# Patient Record
Sex: FEMALE | Race: OTHER | Employment: OTHER | URBAN - METROPOLITAN AREA
[De-identification: names, ages, dates, MRNs, and addresses within clinical notes are randomized per-mention and may not be internally consistent; named-entity substitution may affect disease eponyms.]

---

## 2017-10-09 ENCOUNTER — NEW PATIENT COMPREHENSIVE (OUTPATIENT)
Dept: URBAN - METROPOLITAN AREA CLINIC 43 | Facility: CLINIC | Age: 72
End: 2017-10-09

## 2017-10-09 VITALS — HEIGHT: 55 IN | DIASTOLIC BLOOD PRESSURE: 74 MMHG | SYSTOLIC BLOOD PRESSURE: 149 MMHG | HEART RATE: 65 BPM

## 2017-10-09 DIAGNOSIS — H25.812: ICD-10-CM

## 2017-10-09 DIAGNOSIS — H43.812: ICD-10-CM

## 2017-10-09 DIAGNOSIS — H25.811: ICD-10-CM

## 2017-10-09 PROCEDURE — 99204 OFFICE O/P NEW MOD 45 MIN: CPT

## 2017-10-09 PROCEDURE — 92136TC INTERFEROMETRY - TECHNICAL COMPONENT

## 2017-10-09 PROCEDURE — G8753 SYS BP > OR = 140: HCPCS

## 2017-10-09 PROCEDURE — 92025-2 CORNEAL TOPOGRAPHY, PT

## 2017-10-09 PROCEDURE — G8482 FLU IMMUNIZE ORDER/ADMIN: HCPCS

## 2017-10-09 PROCEDURE — 4040F PNEUMOC VAC/ADMIN/RCVD: CPT

## 2017-10-09 PROCEDURE — 1036F TOBACCO NON-USER: CPT

## 2017-10-09 PROCEDURE — G8754 DIAS BP LESS 90: HCPCS

## 2017-10-09 PROCEDURE — G8427 DOCREV CUR MEDS BY ELIG CLIN: HCPCS

## 2017-10-09 PROCEDURE — 92134 CPTRZ OPH DX IMG PST SGM RTA: CPT

## 2017-10-09 ASSESSMENT — TONOMETRY
OS_IOP_MMHG: 16
OD_IOP_MMHG: 17

## 2017-10-09 ASSESSMENT — VISUAL ACUITY
OS_SC: J8
OD_SC: 20/100
OD_PH: 20/40-1
OS_SC: 20/80
OS_PH: 20/50-1
OS_GLARE: 20/100
OD_GLARE: 20/200
OD_SC: J3

## 2017-10-26 ENCOUNTER — SURGERY/PROCEDURE (OUTPATIENT)
Dept: URBAN - METROPOLITAN AREA CLINIC 43 | Facility: CLINIC | Age: 72
End: 2017-10-26

## 2017-10-26 ENCOUNTER — POST-OP (OUTPATIENT)
Dept: URBAN - METROPOLITAN AREA SURGERY 14 | Facility: SURGERY | Age: 72
End: 2017-10-26

## 2017-10-26 DIAGNOSIS — H25.811: ICD-10-CM

## 2017-10-26 DIAGNOSIS — Z96.1: ICD-10-CM

## 2017-10-26 PROCEDURE — 66984CV REMOVE CATARACT, INSERT LENS, CUSTOM VISION

## 2017-10-26 ASSESSMENT — VISUAL ACUITY
OD_SC: 20/60
OS_SC: 20/50-1

## 2017-10-26 ASSESSMENT — TONOMETRY
OD_IOP_MMHG: 16
OS_IOP_MMHG: 16

## 2017-10-27 ENCOUNTER — CATARACT POST-OP 1-DAY (OUTPATIENT)
Dept: URBAN - METROPOLITAN AREA CLINIC 43 | Facility: CLINIC | Age: 72
End: 2017-10-27

## 2017-10-27 DIAGNOSIS — Z96.1: ICD-10-CM

## 2017-10-27 PROCEDURE — 99024 POSTOP FOLLOW-UP VISIT: CPT

## 2017-10-27 ASSESSMENT — TONOMETRY: OD_IOP_MMHG: 15

## 2017-10-27 ASSESSMENT — VISUAL ACUITY: OD_SC: 20/50+2

## 2017-10-30 ENCOUNTER — POST OP/EVAL OF SECOND EYE (OUTPATIENT)
Dept: URBAN - METROPOLITAN AREA CLINIC 43 | Facility: CLINIC | Age: 72
End: 2017-10-30

## 2017-10-30 DIAGNOSIS — H25.812: ICD-10-CM

## 2017-10-30 PROCEDURE — G8756 NO BP MEASURE DOC: HCPCS

## 2017-10-30 PROCEDURE — 92012 INTRM OPH EXAM EST PATIENT: CPT

## 2017-10-30 PROCEDURE — 1036F TOBACCO NON-USER: CPT

## 2017-10-30 PROCEDURE — G8427 DOCREV CUR MEDS BY ELIG CLIN: HCPCS

## 2017-10-30 ASSESSMENT — VISUAL ACUITY
OS_PH: 20/40-1
OS_SC: 20/70+1
OD_SC: 20/30-2

## 2017-10-30 ASSESSMENT — TONOMETRY
OS_IOP_MMHG: 12
OD_IOP_MMHG: 14

## 2017-11-02 ENCOUNTER — SURGERY/PROCEDURE (OUTPATIENT)
Dept: URBAN - METROPOLITAN AREA CLINIC 43 | Facility: CLINIC | Age: 72
End: 2017-11-02

## 2017-11-02 ENCOUNTER — POST-OP (OUTPATIENT)
Dept: URBAN - METROPOLITAN AREA CLINIC 39 | Facility: CLINIC | Age: 72
End: 2017-11-02

## 2017-11-02 DIAGNOSIS — Z96.1: ICD-10-CM

## 2017-11-02 DIAGNOSIS — H25.812: ICD-10-CM

## 2017-11-02 PROCEDURE — 66984CV REMOVE CATARACT, INSERT LENS, CUSTOM VISION

## 2017-11-02 PROCEDURE — 66999LNSR LENSAR LASER FOR CAT SX

## 2017-11-02 ASSESSMENT — VISUAL ACUITY
OD_SC: 20/30-2
OS_SC: 20/40-1
OD_SC: J10
OS_SC: J12

## 2017-11-02 ASSESSMENT — TONOMETRY
OS_IOP_MMHG: 12
OD_IOP_MMHG: 14

## 2017-11-03 ENCOUNTER — CATARACT POST-OP 1-DAY (OUTPATIENT)
Dept: URBAN - METROPOLITAN AREA CLINIC 43 | Facility: CLINIC | Age: 72
End: 2017-11-03

## 2017-11-03 DIAGNOSIS — Z96.1: ICD-10-CM

## 2017-11-03 PROCEDURE — 99024 POSTOP FOLLOW-UP VISIT: CPT

## 2017-11-03 ASSESSMENT — VISUAL ACUITY
OD_SC: 20/30-1
OS_SC: J12+
OD_SC: J4
OS_SC: 20/50+1

## 2017-11-03 ASSESSMENT — TONOMETRY: OS_IOP_MMHG: 16

## 2017-11-20 ENCOUNTER — POST-OP CATARACT (OUTPATIENT)
Dept: URBAN - METROPOLITAN AREA CLINIC 43 | Facility: CLINIC | Age: 72
End: 2017-11-20

## 2017-11-20 DIAGNOSIS — Z96.1: ICD-10-CM

## 2017-11-20 PROCEDURE — 99024 POSTOP FOLLOW-UP VISIT: CPT

## 2017-11-20 ASSESSMENT — VISUAL ACUITY
OS_PH: 20/40-2
OS_SC: 20/200
OS_SC: J1
OD_SC: 20/25-3
OD_SC: J3

## 2017-11-20 ASSESSMENT — TONOMETRY
OD_IOP_MMHG: 14
OS_IOP_MMHG: 14

## 2018-05-03 ENCOUNTER — ESTABLISHED COMPREHENSIVE EXAM (OUTPATIENT)
Dept: URBAN - METROPOLITAN AREA CLINIC 43 | Facility: CLINIC | Age: 73
End: 2018-05-03

## 2018-05-03 DIAGNOSIS — H04.123: ICD-10-CM

## 2018-05-03 DIAGNOSIS — Z96.1: ICD-10-CM

## 2018-05-03 DIAGNOSIS — H43.812: ICD-10-CM

## 2018-05-03 PROCEDURE — 1036F TOBACCO NON-USER: CPT

## 2018-05-03 PROCEDURE — G8427 DOCREV CUR MEDS BY ELIG CLIN: HCPCS

## 2018-05-03 PROCEDURE — 92014 COMPRE OPH EXAM EST PT 1/>: CPT

## 2018-05-03 PROCEDURE — 92015 DETERMINE REFRACTIVE STATE: CPT

## 2018-05-03 ASSESSMENT — VISUAL ACUITY
OD_BAT: 20/50
OD_SC: J10
OS_SC: J1
OS_SC: 20/100
OD_SC: 20/30-3
OS_BAT: 20/50
OS_PH: 20/30-1

## 2018-05-03 ASSESSMENT — TONOMETRY
OD_IOP_MMHG: 11
OS_IOP_MMHG: 11

## 2019-05-06 ENCOUNTER — ESTABLISHED COMPREHENSIVE EXAM (OUTPATIENT)
Dept: URBAN - METROPOLITAN AREA CLINIC 43 | Facility: CLINIC | Age: 74
End: 2019-05-06

## 2019-05-06 DIAGNOSIS — H43.812: ICD-10-CM

## 2019-05-06 DIAGNOSIS — Z96.1: ICD-10-CM

## 2019-05-06 DIAGNOSIS — H04.123: ICD-10-CM

## 2019-05-06 PROCEDURE — 92014 COMPRE OPH EXAM EST PT 1/>: CPT

## 2019-05-06 PROCEDURE — 92015 DETERMINE REFRACTIVE STATE: CPT

## 2019-05-06 ASSESSMENT — VISUAL ACUITY
OU_SC: 20/25-3
OS_SC: J1
OS_PH: 20/40
OU_SC: J1-
OD_SC: 20/30
OS_SC: 20/200
OD_SC: J8

## 2019-05-06 ASSESSMENT — TONOMETRY
OD_IOP_MMHG: 12
OS_IOP_MMHG: 12

## 2020-02-26 NOTE — PATIENT DISCUSSION
COUNSELING:  I have talked with the patient about my impressions, explained our treatment plan, and have answered all questions. Continue present medications. Return in 6 months for LARY.

## 2020-09-22 NOTE — PATIENT DISCUSSION
GLAUCOMA:  I have talked with the patient about my impressions, explained our treatment plan, and have answered all questions and patient understands. Continue present eye drops.

## 2021-05-14 NOTE — PATIENT DISCUSSION
GLAUCOMA:  I have talked with the patient about my impressions, explained our treatment plan, and have answered all questions and patient understands. Continue present eye drops. Patient to follow up 8 months.  VF OU

## 2021-05-14 NOTE — PATIENT DISCUSSION
DIABETES without RETINOPATHY:  I have talked with the patient about the potential for future development of diabetic retinopathy and the potential for significant visual complications.   For this reason regular follow-up with an eye doctor is essential. No retinopathy on todays dilated exam.

## 2022-01-07 NOTE — PATIENT DISCUSSION
GLAUCOMA:  I have talked with the patient about my impressions, explained our treatment plan, and have answered all questions and patient understands. Continue present eye drops. Patient to follow up 1 year with dilation and OCT.

## 2022-05-04 ENCOUNTER — COMPREHENSIVE EXAM (OUTPATIENT)
Dept: URBAN - METROPOLITAN AREA CLINIC 30 | Facility: CLINIC | Age: 77
End: 2022-05-04

## 2022-05-04 DIAGNOSIS — H40.013: ICD-10-CM

## 2022-05-04 DIAGNOSIS — H04.123: ICD-10-CM

## 2022-05-04 PROCEDURE — 92133 CPTRZD OPH DX IMG PST SGM ON: CPT

## 2022-05-04 PROCEDURE — 76514 ECHO EXAM OF EYE THICKNESS: CPT

## 2022-05-04 PROCEDURE — 92014 COMPRE OPH EXAM EST PT 1/>: CPT

## 2022-05-04 ASSESSMENT — TONOMETRY
OD_IOP_MMHG: 9
OD_IOP_MMHG: 11
OS_IOP_MMHG: 12
OS_IOP_MMHG: 12

## 2022-05-04 ASSESSMENT — KERATOMETRY
OS_K2POWER_DIOPTERS: 38.75
OS_K1POWER_DIOPTERS: 39.75
OS_AXISANGLE2_DEGREES: 96
OD_K2POWER_DIOPTERS: 38.75
OD_K1POWER_DIOPTERS: 42.00
OD_AXISANGLE2_DEGREES: 79
OS_AXISANGLE_DEGREES: 6
OD_AXISANGLE_DEGREES: 169

## 2022-05-04 ASSESSMENT — VISUAL ACUITY
OD_SC: 20/20
OU_SC: 20/20
OU_SC: 20/20-2
OS_SC: 20/40
OS_SC: 20/30

## 2022-05-16 ASSESSMENT — KERATOMETRY
OS_K1POWER_DIOPTERS: 39.75
OS_AXISANGLE2_DEGREES: 96
OD_AXISANGLE_DEGREES: 169
OD_K1POWER_DIOPTERS: 42.00
OS_AXISANGLE_DEGREES: 6
OS_K2POWER_DIOPTERS: 38.75
OD_AXISANGLE2_DEGREES: 79
OD_K2POWER_DIOPTERS: 38.75

## 2022-05-31 ENCOUNTER — FOLLOW UP (OUTPATIENT)
Dept: URBAN - METROPOLITAN AREA CLINIC 30 | Facility: CLINIC | Age: 77
End: 2022-05-31

## 2022-05-31 DIAGNOSIS — H26.493: ICD-10-CM

## 2022-05-31 DIAGNOSIS — H52.12: ICD-10-CM

## 2022-05-31 PROCEDURE — 99213 OFFICE O/P EST LOW 20 MIN: CPT

## 2022-05-31 PROCEDURE — 92015 DETERMINE REFRACTIVE STATE: CPT

## 2022-05-31 ASSESSMENT — TONOMETRY
OD_IOP_MMHG: 10
OS_IOP_MMHG: 12

## 2022-05-31 ASSESSMENT — VISUAL ACUITY
OS_SC: 20/30
OD_SC: 20/20
OS_SC: 20/30

## 2022-06-27 ENCOUNTER — CONSULTATION/EVALUATION (OUTPATIENT)
Dept: URBAN - METROPOLITAN AREA CLINIC 39 | Facility: CLINIC | Age: 77
End: 2022-06-27

## 2022-06-27 ENCOUNTER — SURGERY/PROCEDURE (OUTPATIENT)
Dept: URBAN - METROPOLITAN AREA SURGERY 14 | Facility: SURGERY | Age: 77
End: 2022-06-27

## 2022-06-27 DIAGNOSIS — H26.492: ICD-10-CM

## 2022-06-27 PROCEDURE — 66821 AFTER CATARACT LASER SURGERY: CPT

## 2022-06-27 ASSESSMENT — KERATOMETRY
OD_K1POWER_DIOPTERS: 42.00
OS_K2POWER_DIOPTERS: 38.75
OD_AXISANGLE2_DEGREES: 79
OD_K2POWER_DIOPTERS: 38.75
OS_K1POWER_DIOPTERS: 39.75
OD_AXISANGLE_DEGREES: 169
OS_AXISANGLE2_DEGREES: 96
OS_AXISANGLE_DEGREES: 6

## 2022-06-27 ASSESSMENT — TONOMETRY
OS_IOP_MMHG: 12
OD_IOP_MMHG: 12

## 2022-06-27 ASSESSMENT — VISUAL ACUITY
OS_SC: 20/30
OS_BAT: 20/400
OD_SC: 20/20

## 2022-06-28 ASSESSMENT — KERATOMETRY
OS_K2POWER_DIOPTERS: 38.75
OS_AXISANGLE2_DEGREES: 96
OD_K2POWER_DIOPTERS: 38.75
OS_K1POWER_DIOPTERS: 39.75
OD_AXISANGLE2_DEGREES: 79
OS_AXISANGLE_DEGREES: 6
OD_AXISANGLE_DEGREES: 169
OD_K1POWER_DIOPTERS: 42.00

## 2022-07-25 ASSESSMENT — KERATOMETRY
OS_AXISANGLE2_DEGREES: 96
OS_K1POWER_DIOPTERS: 39.75
OD_K1POWER_DIOPTERS: 42.00
OD_K2POWER_DIOPTERS: 38.75
OS_AXISANGLE_DEGREES: 6
OD_AXISANGLE_DEGREES: 169
OD_AXISANGLE2_DEGREES: 79
OS_K2POWER_DIOPTERS: 38.75

## 2022-07-26 ENCOUNTER — POST-OP (OUTPATIENT)
Dept: URBAN - METROPOLITAN AREA CLINIC 30 | Facility: CLINIC | Age: 77
End: 2022-07-26

## 2022-07-26 DIAGNOSIS — Z96.1: ICD-10-CM

## 2022-07-26 PROCEDURE — 99024 POSTOP FOLLOW-UP VISIT: CPT

## 2022-07-26 ASSESSMENT — TONOMETRY
OD_IOP_MMHG: 11
OS_IOP_MMHG: 11

## 2022-07-26 ASSESSMENT — VISUAL ACUITY
OS_SC: 20/60
OU_SC: 20/20-2
OS_SC: 20/25+2
OD_SC: 20/20-1

## 2022-12-27 NOTE — PATIENT DISCUSSION
In the future is she does do a VF she might be happier with a 10-2 or even an Armaly to make it easier.